# Patient Record
Sex: MALE | Race: WHITE
[De-identification: names, ages, dates, MRNs, and addresses within clinical notes are randomized per-mention and may not be internally consistent; named-entity substitution may affect disease eponyms.]

---

## 2017-11-14 ENCOUNTER — HOSPITAL ENCOUNTER (EMERGENCY)
Dept: HOSPITAL 60 - LB.ED | Age: 57
Discharge: NURSING, REHAB OR LONG-TERM CARE | End: 2017-11-14
Payer: MEDICARE

## 2017-11-14 VITALS — SYSTOLIC BLOOD PRESSURE: 129 MMHG | DIASTOLIC BLOOD PRESSURE: 70 MMHG

## 2017-11-14 DIAGNOSIS — Z88.6: ICD-10-CM

## 2017-11-14 DIAGNOSIS — Z79.899: ICD-10-CM

## 2017-11-14 DIAGNOSIS — F10.10: Primary | ICD-10-CM

## 2017-11-14 DIAGNOSIS — F17.200: ICD-10-CM
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
## 2017-11-14 PROCEDURE — 80053 COMPREHEN METABOLIC PANEL: CPT

## 2017-11-14 PROCEDURE — 85025 COMPLETE CBC W/AUTO DIFF WBC: CPT

## 2017-11-14 PROCEDURE — G0480 DRUG TEST DEF 1-7 CLASSES: HCPCS

## 2017-11-14 PROCEDURE — 99284 EMERGENCY DEPT VISIT MOD MDM: CPT

## 2017-11-14 PROCEDURE — 84443 ASSAY THYROID STIM HORMONE: CPT

## 2017-11-14 PROCEDURE — 80307 DRUG TEST PRSMV CHEM ANLYZR: CPT

## 2017-11-14 PROCEDURE — 36415 COLL VENOUS BLD VENIPUNCTURE: CPT

## 2017-11-14 NOTE — EDM.PDOC
ED HPI GENERAL MEDICAL PROBLEM





- General


Chief Complaint: General


Stated Complaint: DETOX


Time Seen by Provider: 11/14/17 11:30


Source of Information: Reports: Patient, Family





- History of Present Illness


INITIAL COMMENTS - FREE TEXT/NARRATIVE: 





This is a 56yo M here for alcoholism. He would like to go to detox. Patient is 

present with his brother. His last drink was last night. He has tried to quit 

in the past with severe tremors and withdraw symptoms. 


Duration: Chronic





- Related Data


 Allergies











Allergy/AdvReac Type Severity Reaction Status Date / Time


 


aspirin Allergy Severe Nausea Verified 11/14/17 11:34











Home Meds: 


 Home Meds





lamoTRIgine [Lamotrigine] 25 mg PO DAILY 05/10/15 [History]


ClonazePAM [KlonoPIN] 1 mg PO BID 11/14/17 [History]


Omeprazole 20 mg PO DAILY 11/14/17 [History]











Social & Family History





- Tobacco Use


Smoking Status *Q: Heavy Tobacco Smoker


Years of Tobacco use: 30


Used Tobacco, but Quit: No


Month Tobacco Last Used: May


Second Hand Smoke Exposure: No





- Alcohol Use


Days Per Week of Alcohol Use: 7


Number of Drinks Per Day: 6


Total Drinks Per Week: 42





- Recreational Drug Use


Recreational Drug Use: No





ED ROS GENERAL





- Review of Systems


Review Of Systems: ROS reveals no pertinent complaints other than HPI.





ED EXAM, GENERAL





- Physical Exam


Exam: See Below


Exam Limited By: No Limitations


General Appearance: Alert, WD/WN, No Apparent Distress


Ears: Normal External Exam


Nose: Normal Inspection


Throat/Mouth: Normal Inspection


Head: Atraumatic, Normocephalic


Neck: Normal Inspection


Respiratory/Chest: No Respiratory Distress, Lungs Clear, Normal Breath Sounds


Cardiovascular: Normal Peripheral Pulses, Regular Rate, Rhythm


Extremities: Normal Inspection


Neurological: Alert, Oriented, CN II-XII Intact





Course





- Vital Signs


Last Recorded V/S: 


 Last Vital Signs











Temp  37.0 C   11/14/17 11:29


 


Pulse  96   11/14/17 11:41


 


Resp      


 


BP  129/70   11/14/17 11:41


 


Pulse Ox  98   11/14/17 11:41














- Orders/Labs/Meds


Labs: 


 Laboratory Tests











  11/14/17 11/14/17 11/14/17 Range/Units





  11:15 11:30 11:30 


 


WBC   4.5   (4.0-11.0)  K/uL


 


RBC   3.43 L   (4.50-6.50)  M/uL


 


Hgb   11.3 L   (13.0-18.0)  g/dL


 


Hct   32.0 L   (40.0-54.0)  %


 


MCV   93   (76-96)  fL


 


MCH   32.9 H   (27.0-32.0)  pg


 


MCHC   35.3 H   (31.0-35.0)  g/dL


 


RDW   14.0   (11.0-16.0)  %


 


Plt Count   63 L D   (150-400)  K/uL


 


MPV   10.6 H   (6.0-10.0)  fL


 


Neut % (Auto)   70.1 H   (45.0-70.0)  %


 


Lymph % (Auto)   14.5 L   (20.0-40.0)  %


 


Mono % (Auto)   13.0 H   (3.0-10.0)  %


 


Eos % (Auto)   1.1   (1.0-5.0)  %


 


Baso % (Auto)   1.3 H   (0.0-0.5)  %


 


Neut # (Auto)   3.18   (2.00-7.50)  K/uL


 


Lymph # (Auto)   0.66 L   (1.50-4.00)  K/uL


 


Mono # (Auto)   0.59   (0.20-0.80)  K/uL


 


Eos # (Auto)   0.05   (0.04-0.40)  K/uL


 


Baso # (Auto)   0.06   (0.02-0.10)  K/uL


 


Sodium    132 L  (136-145)  mmol/L


 


Potassium    3.3 L  (3.5-5.1)  mmol/L


 


Chloride    94 L  ()  mmol/L


 


Carbon Dioxide    22.4  (21.0-32.0)  mmol/L


 


Anion Gap    18.9 H  (5.0-15.0)  mmol/L


 


BUN    9  (8-26)  mg/dL


 


Creatinine    0.76  (0.70-1.30)  mg/dL


 


Est Cr Clr Drug Dosing    97.48  mL/min


 


Estimated GFR (MDRD)    > 60  (>60)  MLS/MIN


 


BUN/Creatinine Ratio    11.8  (6-25)  


 


Glucose    101 H  ()  mg/dL


 


Calcium    9.0  (8.5-10.1)  mg/dL


 


Total Bilirubin    1.9 H D  (0.0-1.0)  mg/dL


 


AST    124 H  (15-37)  U/L


 


ALT    98 H  (12-78)  U/L


 


Alkaline Phosphatase    140 H  ()  U/L


 


Total Protein    6.5  (6.4-8.2)  g/dL


 


Albumin    3.7  (3.4-5.0)  g/dL


 


Globulin    2.8  (2.2-4.2)  g/dL


 


Albumin/Globulin Ratio    1.3  (0.8-2.0)  


 


TSH, Ultra Sensitive    1.275  D  (0.358-3.740)  uIU/mL


 


Urine Opiates Screen  Negative    (NEGATIVE)  


 


Ur Oxycodone Screen  Negative    (NEGATIVE)  


 


Urine Methadone Screen  Negative    (NEGATIVE)  


 


U Acetaminophen Screen  Negative    (NEGATIVE)  


 


Ur Barbiturates Screen  Negative    (NEGATIVE)  


 


Ur Tricyclics Screen  Negative    (NEGATIVE)  


 


Ur Phencyclidine Scrn  Negative    (NEGATIVE)  


 


Ur Amphetamine Screen  Negative    (NEGATIVE)  


 


U Methamphetamines Scrn  Negative    (NEGATIVE)  


 


U Benzodiazepines Scrn  Negative    (NEGATIVE)  


 


U Cocaine Metab Screen  Negative    (NEGATIVE)  


 


U Marijuana (THC) Screen  Negative    (NEGATIVE)  


 


Ethyl Alcohol    25.0 H  (0.0-0.0)  mg/dL














Departure





- Departure


Time of Disposition: 13:00


Disposition: DC/Tfer to [facility]


Condition: Good


Clinical Impression: 


 Alcohol abuse








- Discharge Information


Referrals: 


PCP,None [Primary Care Provider] - 


Forms:  ED Department Discharge





- Problem List & Annotations


(1) Alcohol abuse


SNOMED Code(s): 04293438


   Code(s): F10.10 - ALCOHOL ABUSE, UNCOMPLICATED   Status: Acute   Current 

Visit: Yes   





- Problem List Review


Problem List Initiated/Reviewed/Updated: Yes





- Assessment/Plan


Plan: 





Patient to be discharged for brother to take him to [facility]. 

Patient and brother agree with plan of care. Lab results given to patient for 

[facility]. Patient in very stable condition, no DT or signs of DT. He drank 

last night but has a low ETOH level. Informed [facility] or anemia and labs.

## 2018-09-02 ENCOUNTER — HOSPITAL ENCOUNTER (EMERGENCY)
Dept: HOSPITAL 60 - LB.ED | Age: 58
Discharge: HOME | End: 2018-09-02
Payer: MEDICARE

## 2018-09-02 VITALS — SYSTOLIC BLOOD PRESSURE: 110 MMHG | DIASTOLIC BLOOD PRESSURE: 68 MMHG

## 2018-09-02 DIAGNOSIS — T63.441A: Primary | ICD-10-CM

## 2018-09-02 NOTE — ER
Date of Service:  09/02/2018

 

HISTORY OF PRESENT ILLNESS:  The patient is a 58-year-old male who comes in 
today with a

chief complaint of a bee sting.  He notes it stung him on the left hand last 
night about 8

o'clock.  His hand has fairly steadily swelled up since that time.  He is not 
short of

breath.  He does not have any fevers or chills.  The hand is a slightly tender, 
but not

particularly painful.  He has no other issues going.

 

PHYSICAL EXAMINATION:

GENERAL:  He is alert, oriented, no apparent distress.

VITAL SIGNS:  Temperature is 97.7, blood pressure is 110/68, pulse is 74, 
respirations 18,

O2 saturation is 97.1.

EXTREMITIES:  His hand is swollen over the knuckles and fingers, a little bit 
up on to the

wrist.  A couple of inches down on the wrist, there is minimal erythema.  It is 
mildly warm

and tender.  There is no red streaking.

 

ASSESSMENT:  Bee sting, most likely it is secondary reaction to the bee sting.  
I did

discuss with the patient that it is unlikely in this time frame that it is 
infected and it

is much more likely to be a local reaction.  He can take some Benadryl, put an 
ice pack on

it, basically symptomatic treatment.  If it is not getting better by tomorrow, 
would have

him return to clinic or for worsening symptoms.

 

 

DAVION/DEYVI

DD:  09/02/2018 09:52:39

DT:  09/02/2018 16:09:50

Job #:  [id number]

MTDD
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
## 2020-06-02 NOTE — CT
DATE OF SERVICE:  06/02/20

CLINICAL DATA:  trauma



LEFT HIP CT:  



Multislice axial acquisition was performed. 



There is marked motion artifact degrading image quality.  The exam is 
nondiagnostic.  Repeat exam is recommended. 



[id number]
[facility]

## 2020-06-02 NOTE — CR
DATE OF SERVICE:  06/01/20

CLINICAL DATA:  hip pain



LEFT HIP AND FEMUR:  



There is a healed fracture through the distal femoral diaphysis that is fixed 
internally with an intramedullary kate. 



There are mild osteoarthritic changes of the left hip joint.  



There is subtle lucency in the left subcapital femoral neck region.  The 
possibility of a fracture should be considered.  CT scan may be helpful.  



[id number]
[facility]

## 2020-06-02 NOTE — CT
DATE OF SERVICE:  06/01/20

CLINICAL DATA:  trauma



UNENHANCED BRAIN CT:  



No priors. 



There is diffuse cerebral atrophy.  There are periventricular lucencies 
bilaterally consistent with small vessel ischemic change.  



No masses or mass effect.  No intracranial hemorrhage.  No evidence of acute or 
subacute infarct.  



There is a cephalohematoma within the scalp adjacent to the left frontal bone.  
No underlying fractures. 



No bony abnormalities. 



IMPRESSION:  No acute intracranial abnormalities. 



[id number]
[facility]

## 2020-06-10 NOTE — ADMIT
HISTORY OF PRESENT ILLNESS:  This 59-year-old male was admitted on swing bed status this

evening.  He is status post ORIF of the left hip.  Date of surgery, June 5, 2020.  The

patient fell and sustained a hip fracture as well as a contusion injury to the left side of

the temple area.  The patient tells me that his pain is a little worse, but because of the

trip here from the surgical center, he does have pain medications and states they have been

working.  The patient tells me he is able to walk with a walker a little bit now enough to

get around in the room and going to the bathroom.  He rates his pain a 7/10.  The patient

tells me he feels alright.

 

PHYSICAL EXAMINATION:

VITAL SIGNS:  Revealed temp 97.8, blood pressure 154/89, O2 sats 99%, pulse 89, respirations

18.  Examining the patient's left hip area reveals an incision on the lateral aspect of the

upper thigh that is covered with a dressing.  The dressing is intact.  There is no sign of

redness around the dressing.  There is some bruising on the posterior aspect of the proximal

thigh.  The patient has no numbness, tingling or pain distal to the knee.  LUNGS:  Clear.

SKIN:  Warm and dry.

 

The patient is admitted for swing bed status mainly for PT and OT.  This will be ordered

tomorrow I believe.  No further measures are needed at this time.

 

 

JOSE ROBERTO/DEYVI

DD:  06/09/2020 19:39:24

DT:  06/09/2020 21:59:43

Job #:  [id number]
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
## 2020-06-16 NOTE — CR
DATE OF SERVICE:  06/16/20

CLINICAL DATA:  post op day 11, worsening L hip pain



LEFT HIP:



Comparison is made to a prior exam dated 06/01/20.  



The patient is status post internal fixation of the left hip fracture.  The 
fracture fragments are in anatomic alignment and position.  



The exam is otherwise unchanged from the prior.  



[id number]
[facility]

## 2020-06-19 NOTE — PCM.DCSUM1
**Discharge Summary





- Hospital Course


Free Text/Narrative:: 





pt has been swingbed since 10 [address]ha and working with PT and OT since after ORIF 

left hip surgery 5 June 2020. initially pt was struggling with pain prior to 

therapy but has since improved greatly and currently only needing 2-3 tabs of 

oxycodone per day. on 16 June pt stated new hip pain when initiating ambulation,

with concern for onset of osteonecrosis with pt's history of alcoholism a xray 

of left hip was done showing no changes from previous. later that day pt states 

his symptoms had improved after standing momentarily prior to ambulation. 

symptoms have mostly resolved since. pt was D/C from OT yesterday after 

successful meal cooking. will be d/c from PT today. plan to discharge from 

swingbed home tomorrow after lunch. 





- Discharge Data


Discharge Date: 06/20/20


Discharge Disposition: Home, Self-Care 01


Condition: Good





- Referral to Home Health


Primary Care Physician: 


PCP None








- Discharge Diagnosis/Problem(s)


(1) Fracture of femoral head


SNOMED Code(s): 608901717


   ICD Code: S72.059A - UNSP FRACTURE OF HEAD OF UNSP FEMUR, INIT FOR CLOS FX   

Status: Acute   Priority: High   Current Visit: Yes   


Qualifiers: 


   Fracture type: closed   Laterality: left   Fracture healing: with routine 

healing 





- Patient Summary/Data


Consults: 


                                  Consultations





06/10/20 08:24


OT Evaluation and Treatment [CONS] Routine 


   Please Evaluate and Treat.


   OT Reason for Consult: Strengthening


   This query below is only for informational purposes and is not editable.


   Admission Diagnosis/Problem: Weakness of left lower extremity


PT Evaluation and Treatment [CONS] Routine 


   Please Evaluate and Treat.


   PT Reason for Consult: Strengthening


   This query below is only for informational purposes and is not editable.


   Admission Diagnosis/Problem: Weakness of left lower extremity














- Patient Instructions


Diet: Usual Diet as Tolerated


Activity: As Tolerated


Showering/Bathing: May Shower


Wound/Incision Care: Keep Operative Site/Wound Site Clean and Dry


Notify Provider of: Fever, Swelling and Redness, Drainage





- Discharge Plan


*PRESCRIPTION DRUG MONITORING PROGRAM REVIEWED*: Yes


*COPY OF PRESCRIPTION DRUG MONITORING REPORT IN PATIENT MARK: No


Prescriptions/Med Rec: 


Aspirin 325 mg PO DAILY #30 tab


Calcium Carbonate/Vitamin D3 [Caltrate 600+D 1500 MG-400 Units] 1 tab PO 

BIDMEALS #60 tablet


Potassium Chloride [Klor-Con 10] 10 meq PO BID #60 tab.er


oxyCODONE 5 mg PO TID PRN #12 tablet


 PRN Reason: MODERATE PAIN


Albuterol [Ventolin HFA] 8 gm INH Q6H PRN #1 inhaler


 PRN Reason: Shortness Of Breath


Cholecalciferol (Vitamin D3) [Vitamin D3] 25 mcg PO DAILY #30 tablet


Home Medications: 


                                    Home Meds





lamoTRIgine [Lamotrigine] 25 mg PO DAILY 05/10/15 [History]


ClonazePAM [KlonoPIN] 1 mg PO BID 11/14/17 [History]


Omeprazole 20 mg PO DAILY 11/14/17 [History]


Albuterol [Proventil Neb Soln] 0.63 mg NEB TID 09/02/18 [History]


Acetaminophen [Tylenol] 650 mg PO Q6H PRN  tablet 06/19/20 [Rx]


Albuterol [Ventolin HFA] 8 gm INH Q6H PRN #1 inhaler 06/19/20 [Rx]


Aspirin 325 mg PO DAILY #30 tab 06/19/20 [Rx]


Calcium Carbonate/Vitamin D3 [Caltrate 600+D 1500 MG-400 Units] 1 tab PO 

BIDMEALS #60 tablet 06/19/20 [Rx]


Cholecalciferol (Vitamin D3) [Vitamin D3] 25 mcg PO DAILY #30 tablet 06/19/20 

[Rx]


Ibuprofen [Motrin] 600 mg PO Q6H PRN  tablet 06/19/20 [Rx]


Omeprazole 20 mg PO DAILY  cap.cr 06/19/20 [Rx]


Potassium Chloride [Klor-Con 10] 10 meq PO BID #60 tab.er 06/19/20 [Rx]


lamoTRIgine 25 mg PO DAILY  tablet 06/19/20 [Rx]


oxyCODONE 5 mg PO TID PRN #12 tablet 06/19/20 [Rx]








Oxygen Therapy Mode: Room Air





- Discharge Summary/Plan Comment


DC Time >30 min.: Yes


Discharge Summary/Plan Comment: 





pt will follow up with orthopedics July 23 in [place]


PT as outpatient


recommend follow up with PCP in the next 3-4 weeks


buy a walking cane, if desired


staples have been removed, keep this area clean and dry. steri strips applied 

and will fall off in next 5-7 days on their own. 





new RX:  QD for the next month


Vit D3 25mcg QD 30 days


albuterol INH


Kdur 10mEq BID 30 days


Ca Carbonate/ vit D3 BID 30 days


2-wheeled walker











- Patient Data


Vitals - Most Recent: 


                                Last Vital Signs











Temp  97.7 F   06/19/20 07:47


 


Pulse  90   06/19/20 07:47


 


Resp  16   06/19/20 07:47


 


BP  140/87   06/19/20 07:47


 


Pulse Ox  98   06/19/20 07:47











Weight - Most Recent: 123 lb


Med Orders - Current: 


                               Current Medications





Acetaminophen (Tylenol)  650 mg PO Q6H PRN


   PRN Reason: Pain


   Last Admin: 06/12/20 23:08 Dose:  650 mg


   Documented by: 


Albuterol (Ventolin Hfa)  8 gm INH Q6H PRN


   PRN Reason: Shortness of Breath


   Last Admin: 06/18/20 07:35 Dose:  2 puff


   Documented by: 


Albuterol/Ipratropium (Duoneb 3.0-0.5 Mg/3 Ml)  3 ml INH QID PRN


   PRN Reason: Shortness of Breath


Calcium Carbonate (Caltrate 600+D 1500 Mg-400 Units)  1 tab PO BIDMEALS [facility]


   Last Admin: 06/19/20 07:43 Dose:  1 tab


   Documented by: 


Cholecalciferol (Vitamin D3)  25 mcg PO DAILY [facility]


   Last Admin: 06/19/20 07:44 Dose:  25 mcg


   Documented by: 


Enoxaparin Sodium (Lovenox)  40 mg SUBCUT DAILY [facility]


   Stop: 07/06/20 23:59


   Last Admin: 06/19/20 07:44 Dose:  40 mg


   Documented by: 


Ibuprofen (Motrin)  200 mg PO DAILY PRN


   PRN Reason: Pain (mild 1-3)


   Last Admin: 06/12/20 07:55 Dose:  200 mg


   Documented by: 


Ibuprofen (Motrin)  600 mg PO Q6H PRN


   PRN Reason: Pain


   Last Admin: 06/16/20 14:10 Dose:  600 mg


   Documented by: 


Lamotrigine (Lamotrigine)  25 mg PO DAILY [facility]


   Last Admin: 06/19/20 07:44 Dose:  25 mg


   Documented by: 


Omeprazole (Omeprazole)  20 mg PO DAILY [facility]


   Last Admin: 06/19/20 07:43 Dose:  20 mg


   Documented by: 


Oxycodone HCl (Oxycodone)  5 mg PO Q6H PRN


   PRN Reason: MODERATE PAIN


   Last Admin: 06/19/20 07:43 Dose:  5 mg


   Documented by: 


Potassium Chloride (Klor-Con 10)  10 meq PO BID [facility]


   Last Admin: 06/19/20 07:44 Dose:  10 meq


   Documented by: 


Senna/Docusate Sodium (Senna Plus)  2 tab PO BID PRN


   PRN Reason: Diarrhea


Trazodone HCl (Trazodone)  50 mg PO BEDTIME PRN


   PRN Reason: INSOMNIA


   Last Admin: 06/10/20 19:54 Dose:  50 mg


   Documented by: 





Discontinued Medications





Hydrocodone Bitart/Acetaminophen (Norco 325-10 Mg) Confirm Administered Dose 1 

tab .ROUTE .STK-MED ONE


   Stop: 06/15/20 10:55


   Last Admin: 06/15/20 15:28 Dose:  Not Given


   Documented by: 


Hydrocodone Bitart/Acetaminophen (Norco 325-5 Mg) Confirm Administered Dose 1 

tab .ROUTE .STK-MED ONE


   Stop: 06/15/20 10:56


   Last Admin: 06/15/20 15:28 Dose:  Not Given


   Documented by: 


Senna/Docusate Sodium (Senna Plus)  2 tab PO BID [facility]


   Last Admin: 06/12/20 07:56 Dose:  2 tab


   Documented by: 


Tuberculin PPD (Aplisol)  5 unit IDERM ONETIME ONE


   Stop: 06/10/20 08:01


   Last Admin: 06/10/20 08:49 Dose:  5 unit


   Documented by: 











Discharge Operative/Procedures





- Procedures Performed


Operations/Procedure Comment: 





removal of staples: 


dressing removed, cleansed area with soapy water. 15 staples removed 

successfully. 


benzoin tincture applied surrounding site and steri strips applied. wound left 

open to air. 


pt tolerated well.

## 2020-06-23 NOTE — PCM.DCSUM1
**Discharge Summary





- Hospital Course


Free Text/Narrative:: 





Patient is a swing bed from ORIF left hip on 5 June 2020, he was originally 

slated for discharge home this last weekend but hospital staff abruptly learned 

that patient is not his own guardian but that his brother holds guardianship due

to patient's mental health history.  Although patient had stated readiness for 

discharge, he would share concerns with family members differently.  By 19 June 

patient was although physically ready for discharge he was not mentally and 

would not have been a safe discharge home secondary to his mental health and 

lack of at home resources throughout the weekend.  Patient's brother had set up 

resources for discharge today, patient will be discharging home with outpatient 

physical therapy, community nursing services, and newly set up telephone sources

and life alert button patient will keep on himself in case of emergencies.





- Discharge Data


Discharge Date: 06/23/20


Discharge Disposition: Home, Self-Care 01


Condition: Good





- Referral to Home Health


Primary Care Physician: 


PCP None








- Discharge Diagnosis/Problem(s)


(1) Fracture of femoral head


SNOMED Code(s): 820451025


   ICD Code: S72.059A - UNSP FRACTURE OF HEAD OF UNSP FEMUR, INIT FOR CLOS FX   

Status: Acute   Priority: High   


Qualifiers: 


   Fracture type: closed   Laterality: left   Fracture healing: with routine 

healing 





- Patient Summary/Data


Consults: 


                                  Consultations





06/10/20 08:24


OT Evaluation and Treatment [CONS] Routine 


   Please Evaluate and Treat.


   OT Reason for Consult: Strengthening


   This query below is only for informational purposes and is not editable.


   Admission Diagnosis/Problem: Weakness of left lower extremity


PT Evaluation and Treatment [CONS] Routine 


   Please Evaluate and Treat.


   PT Reason for Consult: Strengthening


   This query below is only for informational purposes and is not editable.


   Admission Diagnosis/Problem: Weakness of left lower extremity














- Patient Instructions


Diet: Usual Diet as Tolerated


Activity: As Tolerated


Showering/Bathing: May Shower


Wound/Incision Care: Keep Operative Site/Wound Site Clean and Dry


Notify Provider of: Fever, Swelling and Redness, Drainage





- Discharge Plan


*PRESCRIPTION DRUG MONITORING PROGRAM REVIEWED*: Yes


*COPY OF PRESCRIPTION DRUG MONITORING REPORT IN PATIENT MARK: No


Prescriptions/Med Rec: 


Aspirin 325 mg PO DAILY #30 tab


Calcium Carbonate/Vitamin D3 [Caltrate 600+D 1500 MG-400 Units] 1 tab PO 

BIDMEALS #60 tablet


Potassium Chloride [Klor-Con 10] 10 meq PO BID #60 tab.er


oxyCODONE 5 mg PO TID PRN #12 tablet


 PRN Reason: MODERATE PAIN


Albuterol [Ventolin HFA] 8 gm INH Q6H PRN #1 inhaler


 PRN Reason: Shortness Of Breath


Cholecalciferol (Vitamin D3) [Vitamin D3] 25 mcg PO DAILY #30 tablet


Home Medications: 


                                    Home Meds





lamoTRIgine [Lamotrigine] 25 mg PO DAILY 05/10/15 [History]


ClonazePAM [KlonoPIN] 1 mg PO BID 11/14/17 [History]


Omeprazole 20 mg PO DAILY 11/14/17 [History]


Albuterol [Proventil Neb Soln] 0.63 mg NEB TID 09/02/18 [History]


Acetaminophen [Tylenol] 650 mg PO Q6H PRN  tablet 06/19/20 [Rx]


Albuterol [Ventolin HFA] 8 gm INH Q6H PRN #1 inhaler 06/19/20 [Rx]


Aspirin 325 mg PO DAILY #30 tab 06/19/20 [Rx]


Calcium Carbonate/Vitamin D3 [Caltrate 600+D 1500 MG-400 Units] 1 tab PO 

BIDMEALS #60 tablet 06/19/20 [Rx]


Cholecalciferol (Vitamin D3) [Vitamin D3] 25 mcg PO DAILY #30 tablet 06/19/20 

[Rx]


Ibuprofen [Motrin] 600 mg PO Q6H PRN  tablet 06/19/20 [Rx]


Omeprazole 20 mg PO DAILY  cap.cr 06/19/20 [Rx]


Potassium Chloride [Klor-Con 10] 10 meq PO BID #60 tab.er 06/19/20 [Rx]


lamoTRIgine 25 mg PO DAILY  tablet 06/19/20 [Rx]


oxyCODONE 5 mg PO TID PRN #12 tablet 06/19/20 [Rx]








Oxygen Therapy Mode: Room Air





- Discharge Summary/Plan Comment


DC Time >30 min.: Yes





- General Info


Date of Service: 06/23/20


Admission Dx/Problem (Free Text: 





swingbed for PT/OT strengthening and ADLs post ORIF left hip on 5 June 2020.


Subjective Update: 





pt admitted last night for swing bed status from [place], [place] post surgical ORIF L 

hip [id number]. pt states overall he is feeling great, he is highly motivated to 

be discharged home to continue outpatient services and rehab from his surgery.  

Patient denies new fevers, shortness of breath, worsening hip pain, drainage, 

new difficulties with ambulation.





- Review of Systems


General: Reports: Other (All other systems reviewed and found negative except 

for what is noted in HPI)





- Patient Data


Vitals - Most Recent: 


                                Last Vital Signs











Temp  98.4 F   06/23/20 08:00


 


Pulse  73   06/23/20 08:00


 


Resp  16   06/23/20 08:00


 


BP  127/78   06/23/20 08:00


 


Pulse Ox  96   06/23/20 08:00











Weight - Most Recent: 117 lb 3.2 oz


Med Orders - Current: 


                               Current Medications





Acetaminophen (Tylenol)  650 mg PO Q6H PRN


   PRN Reason: Pain


   Last Admin: 06/12/20 23:08 Dose:  650 mg


   Documented by: 


Albuterol (Ventolin Hfa)  8 gm INH Q6H PRN


   PRN Reason: Shortness of Breath


   Last Admin: 06/20/20 14:15 Dose:  2 puff


   Documented by: 


Albuterol/Ipratropium (Duoneb 3.0-0.5 Mg/3 Ml)  3 ml INH QID PRN


   PRN Reason: Shortness of Breath


Calcium Carbonate (Caltrate 600+D 1500 Mg-400 Units)  1 tab PO BIDMEALS [facility]


   Last Admin: 06/23/20 07:30 Dose:  1 tab


   Documented by: 


Cholecalciferol (Vitamin D3)  25 mcg PO DAILY [facility]


   Last Admin: 06/23/20 07:31 Dose:  25 mcg


   Documented by: 


Enoxaparin Sodium (Lovenox)  40 mg SUBCUT DAILY [facility]


   Stop: 07/06/20 23:59


   Last Admin: 06/23/20 07:31 Dose:  40 mg


   Documented by: 


Ibuprofen (Motrin)  200 mg PO DAILY PRN


   PRN Reason: Pain (mild 1-3)


   Last Admin: 06/12/20 07:55 Dose:  200 mg


   Documented by: 


Ibuprofen (Motrin)  600 mg PO Q6H PRN


   PRN Reason: Pain


   Last Admin: 06/22/20 18:08 Dose:  600 mg


   Documented by: 


Lamotrigine (Lamotrigine)  25 mg PO DAILY [facility]


   Last Admin: 06/23/20 07:30 Dose:  25 mg


   Documented by: 


Omeprazole (Omeprazole)  20 mg PO DAILY [facility]


   Last Admin: 06/23/20 07:30 Dose:  20 mg


   Documented by: 


Oxycodone HCl (Oxycodone)  5 mg PO Q6H PRN


   PRN Reason: MODERATE PAIN


   Last Admin: 06/21/20 19:52 Dose:  5 mg


   Documented by: 


Potassium Chloride (Klor-Con 10)  10 meq PO BID [facility]


   Last Admin: 06/23/20 07:30 Dose:  10 meq


   Documented by: 


Senna/Docusate Sodium (Senna Plus)  2 tab PO BID PRN


   PRN Reason: Diarrhea


Trazodone HCl (Trazodone)  50 mg PO BEDTIME PRN


   PRN Reason: INSOMNIA


   Last Admin: 06/10/20 19:54 Dose:  50 mg


   Documented by: 





Discontinued Medications





Hydrocodone Bitart/Acetaminophen (Norco 325-10 Mg) Confirm Administered Dose 1 

tab .ROUTE .STK-MED ONE


   Stop: 06/15/20 10:55


   Last Admin: 06/15/20 15:28 Dose:  Not Given


   Documented by: 


Hydrocodone Bitart/Acetaminophen (Norco 325-5 Mg) Confirm Administered Dose 1 

tab .ROUTE .STK-MED ONE


   Stop: 06/15/20 10:56


   Last Admin: 06/15/20 15:28 Dose:  Not Given


   Documented by: 


Senna/Docusate Sodium (Senna Plus)  2 tab PO BID [facility]


   Last Admin: 06/12/20 07:56 Dose:  2 tab


   Documented by: 


Tuberculin PPD (Aplisol)  5 unit IDERM ONETIME ONE


   Stop: 06/10/20 08:01


   Last Admin: 06/10/20 08:49 Dose:  5 unit


   Documented by: 











- Exam


General: Reports: Alert, Oriented


HEENT: Reports: Pupils Equal, Pupils Reactive, EOMI


Lungs: Reports: Clear to Auscultation, Normal Respiratory Effort


Cardiovascular: Reports: Regular Rate, Regular Rhythm


GI/Abdominal Exam: Normal Bowel Sounds, Soft, Non-Tender


Back Exam: Reports: Normal Inspection, Full Range of Motion


Extremities: Normal Inspection, Normal Range of Motion, Non-Tender, No Pedal 

Edema


Skin: Reports: Warm, Dry, Intact


Wound/Incisions: Reports: Healing Well


Neurological: Reports: No New Focal Deficit


Psy/Mental Status: Reports: Alert, Normal Affect





*Q Meaningful Use (DIS)





- VTE *Q


VTE Mechanical Contraindications *Q: At Risk for Falls


VTE Pharmacological Contraindications *Q: Risk of Bleeding


VTE Anticoagulation Contraindications: Alternative [place] Request PT (Patient 

refuses Lovenox shots on discharge, declines oral anticoagulants.  Patient state

s aspirin is a favorable alternative, prescription written for aspirin daily x1 

month.)